# Patient Record
Sex: FEMALE | Race: WHITE | NOT HISPANIC OR LATINO | Employment: STUDENT | URBAN - METROPOLITAN AREA
[De-identification: names, ages, dates, MRNs, and addresses within clinical notes are randomized per-mention and may not be internally consistent; named-entity substitution may affect disease eponyms.]

---

## 2021-07-02 ENCOUNTER — CLINICAL SUPPORT (OUTPATIENT)
Dept: URGENT CARE | Facility: CLINIC | Age: 21
End: 2021-07-02
Payer: COMMERCIAL

## 2021-07-02 ENCOUNTER — APPOINTMENT (OUTPATIENT)
Dept: LAB | Facility: CLINIC | Age: 21
End: 2021-07-02
Payer: COMMERCIAL

## 2021-07-02 DIAGNOSIS — Z51.81 ENCOUNTER FOR THERAPEUTIC DRUG MONITORING: ICD-10-CM

## 2021-07-02 DIAGNOSIS — Z13.6 SCREENING FOR ISCHEMIC HEART DISEASE: Primary | ICD-10-CM

## 2021-07-02 DIAGNOSIS — E03.9 HYPOTHYROIDISM, UNSPECIFIED TYPE: ICD-10-CM

## 2021-07-02 DIAGNOSIS — E55.9 VITAMIN D DEFICIENCY: ICD-10-CM

## 2021-07-02 DIAGNOSIS — R73.09 IMPAIRED GLUCOSE TOLERANCE TEST: ICD-10-CM

## 2021-07-02 DIAGNOSIS — E78.41 ELEVATED LIPOPROTEIN A LEVEL: ICD-10-CM

## 2021-07-02 LAB
25(OH)D3 SERPL-MCNC: 29.8 NG/ML (ref 30–100)
ALBUMIN SERPL BCP-MCNC: 3.9 G/DL (ref 3.5–5)
ALP SERPL-CCNC: 52 U/L (ref 46–116)
ALT SERPL W P-5'-P-CCNC: 18 U/L (ref 12–78)
ANION GAP SERPL CALCULATED.3IONS-SCNC: 5 MMOL/L (ref 4–13)
AST SERPL W P-5'-P-CCNC: 13 U/L (ref 5–45)
BILIRUB SERPL-MCNC: 1.13 MG/DL (ref 0.2–1)
BUN SERPL-MCNC: 5 MG/DL (ref 5–25)
CALCIUM SERPL-MCNC: 9.5 MG/DL (ref 8.3–10.1)
CHLORIDE SERPL-SCNC: 105 MMOL/L (ref 100–108)
CHOLEST SERPL-MCNC: 195 MG/DL (ref 50–200)
CO2 SERPL-SCNC: 27 MMOL/L (ref 21–32)
CREAT SERPL-MCNC: 0.56 MG/DL (ref 0.6–1.3)
ERYTHROCYTE [DISTWIDTH] IN BLOOD BY AUTOMATED COUNT: 12.1 % (ref 11.6–15.1)
EST. AVERAGE GLUCOSE BLD GHB EST-MCNC: 97 MG/DL
GFR SERPL CREATININE-BSD FRML MDRD: 134 ML/MIN/1.73SQ M
GLUCOSE P FAST SERPL-MCNC: 77 MG/DL (ref 65–99)
HBA1C MFR BLD: 5 %
HCT VFR BLD AUTO: 43.7 % (ref 34.8–46.1)
HDLC SERPL-MCNC: 83 MG/DL
HGB BLD-MCNC: 14.3 G/DL (ref 11.5–15.4)
LDLC SERPL CALC-MCNC: 94 MG/DL (ref 0–100)
MCH RBC QN AUTO: 30.7 PG (ref 26.8–34.3)
MCHC RBC AUTO-ENTMCNC: 32.7 G/DL (ref 31.4–37.4)
MCV RBC AUTO: 94 FL (ref 82–98)
NONHDLC SERPL-MCNC: 112 MG/DL
PLATELET # BLD AUTO: 272 THOUSANDS/UL (ref 149–390)
PMV BLD AUTO: 10.6 FL (ref 8.9–12.7)
POTASSIUM SERPL-SCNC: 4.3 MMOL/L (ref 3.5–5.3)
PROT SERPL-MCNC: 7.1 G/DL (ref 6.4–8.2)
RBC # BLD AUTO: 4.66 MILLION/UL (ref 3.81–5.12)
SODIUM SERPL-SCNC: 137 MMOL/L (ref 136–145)
TRIGL SERPL-MCNC: 91 MG/DL
TSH SERPL DL<=0.05 MIU/L-ACNC: 2.17 UIU/ML (ref 0.36–3.74)
WBC # BLD AUTO: 5.95 THOUSAND/UL (ref 4.31–10.16)

## 2021-07-02 PROCEDURE — 85027 COMPLETE CBC AUTOMATED: CPT

## 2021-07-02 PROCEDURE — 80061 LIPID PANEL: CPT

## 2021-07-02 PROCEDURE — 84443 ASSAY THYROID STIM HORMONE: CPT

## 2021-07-02 PROCEDURE — 36415 COLL VENOUS BLD VENIPUNCTURE: CPT

## 2021-07-02 PROCEDURE — 83036 HEMOGLOBIN GLYCOSYLATED A1C: CPT

## 2021-07-02 PROCEDURE — 80053 COMPREHEN METABOLIC PANEL: CPT

## 2021-07-02 PROCEDURE — 93005 ELECTROCARDIOGRAM TRACING: CPT

## 2021-07-02 PROCEDURE — 82306 VITAMIN D 25 HYDROXY: CPT

## 2021-07-02 NOTE — PROGRESS NOTES
Belen Ochoa had walk-in EKG completed on 07/02/21 at 12:44 PM by Sd Liao LPN  Hungambrocio ELKINS  performed EKG

## 2021-07-06 LAB
ATRIAL RATE: 52 BPM
P AXIS: 10 DEGREES
PR INTERVAL: 158 MS
QRS AXIS: 76 DEGREES
QRSD INTERVAL: 96 MS
QT INTERVAL: 468 MS
QTC INTERVAL: 435 MS
T WAVE AXIS: 42 DEGREES
VENTRICULAR RATE: 52 BPM

## 2021-07-06 PROCEDURE — 93010 ELECTROCARDIOGRAM REPORT: CPT | Performed by: INTERNAL MEDICINE
